# Patient Record
Sex: MALE
[De-identification: names, ages, dates, MRNs, and addresses within clinical notes are randomized per-mention and may not be internally consistent; named-entity substitution may affect disease eponyms.]

---

## 2024-07-05 PROBLEM — Z00.00 ENCOUNTER FOR PREVENTIVE HEALTH EXAMINATION: Status: ACTIVE | Noted: 2024-07-05

## 2024-07-15 ENCOUNTER — APPOINTMENT (OUTPATIENT)
Dept: ORTHOPEDIC SURGERY | Facility: CLINIC | Age: 19
End: 2024-07-15

## 2024-07-16 ENCOUNTER — APPOINTMENT (OUTPATIENT)
Dept: ORTHOPEDIC SURGERY | Facility: CLINIC | Age: 19
End: 2024-07-16
Payer: MEDICAID

## 2024-07-16 DIAGNOSIS — S62.021K DISPLACED FRACTURE OF MIDDLE THIRD OF NAVICULAR [SCAPHOID] BONE OF RIGHT WRIST, SUBSEQUENT ENCOUNTER FOR FRACTURE WITH NONUNION: ICD-10-CM

## 2024-08-20 ENCOUNTER — OUTPATIENT (OUTPATIENT)
Dept: OUTPATIENT SERVICES | Facility: HOSPITAL | Age: 19
LOS: 1 days | Discharge: HOME | End: 2024-08-20

## 2024-08-20 VITALS
OXYGEN SATURATION: 98 % | RESPIRATION RATE: 18 BRPM | DIASTOLIC BLOOD PRESSURE: 79 MMHG | HEIGHT: 68 IN | WEIGHT: 134.92 LBS | HEART RATE: 84 BPM | SYSTOLIC BLOOD PRESSURE: 120 MMHG | TEMPERATURE: 97 F

## 2024-08-20 DIAGNOSIS — S62.021K DISPLACED FRACTURE OF MIDDLE THIRD OF NAVICULAR [SCAPHOID] BONE OF RIGHT WRIST, SUBSEQUENT ENCOUNTER FOR FRACTURE WITH NONUNION: ICD-10-CM

## 2024-08-20 DIAGNOSIS — Z01.818 ENCOUNTER FOR OTHER PREPROCEDURAL EXAMINATION: ICD-10-CM

## 2024-08-20 DIAGNOSIS — Z98.890 OTHER SPECIFIED POSTPROCEDURAL STATES: Chronic | ICD-10-CM

## 2024-08-20 LAB
ANION GAP SERPL CALC-SCNC: 7 MMOL/L — SIGNIFICANT CHANGE UP (ref 5–17)
BUN SERPL-MCNC: 11 MG/DL — SIGNIFICANT CHANGE UP (ref 7–23)
CALCIUM SERPL-MCNC: 9.7 MG/DL — SIGNIFICANT CHANGE UP (ref 8.5–10.1)
CHLORIDE SERPL-SCNC: 107 MMOL/L — SIGNIFICANT CHANGE UP (ref 96–108)
CO2 SERPL-SCNC: 27 MMOL/L — SIGNIFICANT CHANGE UP (ref 22–31)
CREAT SERPL-MCNC: 0.86 MG/DL — SIGNIFICANT CHANGE UP (ref 0.5–1.3)
EGFR: 126 ML/MIN/1.73M2 — SIGNIFICANT CHANGE UP
GLUCOSE SERPL-MCNC: 94 MG/DL — SIGNIFICANT CHANGE UP (ref 70–99)
HCT VFR BLD CALC: 45.5 % — SIGNIFICANT CHANGE UP (ref 39–50)
HGB BLD-MCNC: 14.6 G/DL — SIGNIFICANT CHANGE UP (ref 13–17)
MCHC RBC-ENTMCNC: 27.3 PG — SIGNIFICANT CHANGE UP (ref 27–34)
MCHC RBC-ENTMCNC: 32.1 G/DL — SIGNIFICANT CHANGE UP (ref 32–36)
MCV RBC AUTO: 85.2 FL — SIGNIFICANT CHANGE UP (ref 80–100)
NRBC # BLD: 0 /100 WBCS — SIGNIFICANT CHANGE UP (ref 0–0)
PLATELET # BLD AUTO: 182 K/UL — SIGNIFICANT CHANGE UP (ref 150–400)
POTASSIUM SERPL-MCNC: 4.1 MMOL/L — SIGNIFICANT CHANGE UP (ref 3.5–5.3)
POTASSIUM SERPL-SCNC: 4.1 MMOL/L — SIGNIFICANT CHANGE UP (ref 3.5–5.3)
RBC # BLD: 5.34 M/UL — SIGNIFICANT CHANGE UP (ref 4.2–5.8)
RBC # FLD: 12.8 % — SIGNIFICANT CHANGE UP (ref 10.3–14.5)
SODIUM SERPL-SCNC: 141 MMOL/L — SIGNIFICANT CHANGE UP (ref 135–145)
WBC # BLD: 4.25 K/UL — SIGNIFICANT CHANGE UP (ref 3.8–10.5)
WBC # FLD AUTO: 4.25 K/UL — SIGNIFICANT CHANGE UP (ref 3.8–10.5)

## 2024-08-20 RX ORDER — SODIUM CHLORIDE 9 MG/ML
3 INJECTION INTRAMUSCULAR; INTRAVENOUS; SUBCUTANEOUS EVERY 8 HOURS
Refills: 0 | Status: DISCONTINUED | OUTPATIENT
Start: 2024-09-06 | End: 2024-09-06

## 2024-08-20 NOTE — H&P PST ADULT - PROBLEM SELECTOR PLAN 1
Labs-CBC, BMP  Preop Hibiclens x 1 day instructions reviewed and given  Take routine meds DOS with small sips of water, avoid NSAIDs and OTC supplements  Anesthesiologist to review PST labs, EKG, required clearances, and optimization for surgery

## 2024-08-20 NOTE — H&P PST ADULT - HISTORY OF PRESENT ILLNESS
19M  presents to PSTs/p fall April 2024 and is scheduled for ORIF of right scaphoid fx non union with distal autograft with image on 9/6/24 with

## 2024-08-20 NOTE — H&P PST ADULT - ASSESSMENT
19M  presents to PSTs/p fall 2024 and is scheduled for ORIF of right scaphoid fx non union with distal autograft with image on 24 with Dr.Wu CAPRINI SCORE    AGE RELATED RISK FACTORS                                                             [ ] Age 41-60 years                                            (1 Point)  [ ] Age: 61-74 years                                           (2 Points)                 [ ] Age= 75 years                                                (3 Points)             DISEASE RELATED RISK FACTORS                                                       [ ] Edema in the lower extremities                 (1 Point)                     [ ] Varicose veins                                               (1 Point)                                 [ ] BMI > 25 Kg/m2                                            (1 Point)                                  [ ] Serious infection (ie PNA)                            (1 Point)                     [ ] Lung disease ( COPD, Emphysema)            (1 Point)                                                                          [ ] Acute myocardial infarction                         (1 Point)                  [ ] Congestive heart failure (in the previous month)  (1 Point)         [ ] Inflammatory bowel disease                            (1 Point)                  [ ] Central venous access, PICC or Port               (2 points)       (within the last month)                                                                [ ] Stroke (in the previous month)                        (5 Points)    [ ] Previous or present malignancy                       (2 points)                                                                                                                                                         HEMATOLOGY RELATED FACTORS                                                         [ ] Prior episodes of VTE                                     (3 Points)                     [ ] Positive family history for VTE                      (3 Points)                  [ ] Prothrombin 91709 A                                     (3 Points)                     [ ] Factor V Leiden                                                (3 Points)                        [ ] Lupus anticoagulants                                      (3 Points)                                                           [ ] Anticardiolipin antibodies                              (3 Points)                                                       [ ] High homocysteine in the blood                   (3 Points)                                             [ ] Other congenital or acquired thrombophilia      (3 Points)                                                [ ] Heparin induced thrombocytopenia                  (3 Points)                                        MOBILITY RELATED FACTORS  [ ] Bed rest                                                         (1 Point)  [ ] Plaster cast                                                    (2 points)  [ ] Bed bound for more than 72 hours           (2 Points)    GENDER SPECIFIC FACTORS  [ ] Pregnancy or had a baby within the last month   (1 Point)  [ ] Post-partum < 6 weeks                                   (1 Point)  [ ] Hormonal therapy  or oral contraception   (1 Point)  [ ] History of pregnancy complications              (1 point)  [ ] Unexplained or recurrent              (1 Point)    OTHER RISK FACTORS                                           (1 Point)  [ ] BMI >40, smoking, diabetes requiring insulin, chemotherapy  blood transfusions and length of surgery over 2 hours    SURGERY RELATED RISK FACTORS  [ ]  Section within the last month     (1 Point)  [ ] Minor surgery                                                  (1 Point)  [ ] Arthroscopic surgery                                       (2 Points)  [x] Planned major surgery lasting more            (2 Points)      than 45 minutes     [ ] Elective hip or knee joint replacement       (5 points)       surgery                                                TRAUMA RELATED RISK FACTORS  [ ] Fracture of the hip, pelvis, or leg                       (5 Points)  [ ] Spinal cord injury resulting in paralysis             (5 points)       (in the previous month)    [ ] Paralysis  (less than 1 month)                             (5 Points)  [ ] Multiple Trauma within 1 month                        (5 Points)    Total Score [  2     ]    Caprini Score 0-2: Low Risk, NO VTE prophylaxis required for most patients, encourage ambulation  Caprini Score 3-6: Moderate Risk , pharmacologic VTE prophylaxis is indicated for most patients (in the absence of contraindications)  Caprini Score Greater than or =7: High risk, pharmocologic VTE prophylaxis indicated for most patients (in the absence of contraindications)

## 2024-08-20 NOTE — H&P PST ADULT - NSANTHOSAYNRD_GEN_A_CORE
No. JEFFERY screening performed.  STOP BANG Legend: 0-2 = LOW Risk; 3-4 = INTERMEDIATE Risk; 5-8 = HIGH Risk

## 2024-09-05 ENCOUNTER — TRANSCRIPTION ENCOUNTER (OUTPATIENT)
Age: 19
End: 2024-09-05

## 2024-09-06 ENCOUNTER — APPOINTMENT (OUTPATIENT)
Dept: ORTHOPEDIC SURGERY | Facility: HOSPITAL | Age: 19
End: 2024-09-06
Payer: MEDICAID

## 2024-09-06 ENCOUNTER — OUTPATIENT (OUTPATIENT)
Dept: OUTPATIENT SERVICES | Facility: HOSPITAL | Age: 19
LOS: 1 days | Discharge: HOME | End: 2024-09-06

## 2024-09-06 ENCOUNTER — TRANSCRIPTION ENCOUNTER (OUTPATIENT)
Age: 19
End: 2024-09-06

## 2024-09-06 VITALS
TEMPERATURE: 98 F | WEIGHT: 134.92 LBS | RESPIRATION RATE: 15 BRPM | HEART RATE: 75 BPM | OXYGEN SATURATION: 99 % | HEIGHT: 68 IN | SYSTOLIC BLOOD PRESSURE: 115 MMHG | DIASTOLIC BLOOD PRESSURE: 61 MMHG

## 2024-09-06 VITALS
HEART RATE: 73 BPM | OXYGEN SATURATION: 100 % | RESPIRATION RATE: 16 BRPM | DIASTOLIC BLOOD PRESSURE: 75 MMHG | SYSTOLIC BLOOD PRESSURE: 118 MMHG

## 2024-09-06 DIAGNOSIS — Z98.890 OTHER SPECIFIED POSTPROCEDURAL STATES: Chronic | ICD-10-CM

## 2024-09-06 PROCEDURE — 25440 REPAIR NONU SCPHD CARPL B1: CPT | Mod: RT

## 2024-09-06 DEVICE — IMPLANTABLE DEVICE: Type: IMPLANTABLE DEVICE | Site: RIGHT | Status: FUNCTIONAL

## 2024-09-06 DEVICE — K-WIRE MEDARTIS (SMOOTH) SINGLE TROCAR 1.1MM X 100MM: Type: IMPLANTABLE DEVICE | Site: RIGHT | Status: FUNCTIONAL

## 2024-09-06 RX ORDER — HYDROCODONE BITARTRATE AND ACETAMINOPHEN 5; 325 MG/1; MG/1
1 TABLET ORAL
Qty: 30 | Refills: 0
Start: 2024-09-06 | End: 2024-09-10

## 2024-09-06 NOTE — ASU DISCHARGE PLAN (ADULT/PEDIATRIC) - CARE PROVIDER_API CALL
Aliya Sevilla  Orthopaedic Surgery  [address] [facility], [address] [place] [id number]  Phone: [phone]  Fax: [phone]  Follow Up Time:

## 2024-09-06 NOTE — BRIEF OPERATIVE NOTE - ESTIMATED BLOOD LOSS
15 Quality 130: Documentation Of Current Medications In The Medical Record: Current Medications Documented Detail Level: Detailed Quality 226: Preventive Care And Screening: Tobacco Use: Screening And Cessation Intervention: Patient screened for tobacco use and is an ex/non-smoker

## 2024-09-06 NOTE — ASU PATIENT PROFILE, ADULT - FALL HARM RISK - HARM RISK INTERVENTIONS

## 2024-09-06 NOTE — ASU DISCHARGE PLAN (ADULT/PEDIATRIC) - NS MD DC FALL RISK RISK
For information on Fall & Injury Prevention, visit: [email or web address] OR  [email or web address] OR  https://www.cdc.gov/steadi/patient.html

## 2024-09-09 ENCOUNTER — NON-APPOINTMENT (OUTPATIENT)
Age: 19
End: 2024-09-09
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
## 2024-09-11 DIAGNOSIS — S62.021A DISPLACED FRACTURE OF MIDDLE THIRD OF NAVICULAR [SCAPHOID] BONE OF RIGHT WRIST, INITIAL ENCOUNTER FOR CLOSED FRACTURE: ICD-10-CM
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
## 2024-09-13 ENCOUNTER — APPOINTMENT (OUTPATIENT)
Dept: ORTHOPEDIC SURGERY | Facility: CLINIC | Age: 19
End: 2024-09-13
Payer: MEDICAID

## 2024-09-13 DIAGNOSIS — S62.021K DISPLACED FRACTURE OF MIDDLE THIRD OF NAVICULAR [SCAPHOID] BONE OF RIGHT WRIST, SUBSEQUENT ENCOUNTER FOR FRACTURE WITH NONUNION: ICD-10-CM

## 2024-09-13 PROBLEM — Z78.9 OTHER SPECIFIED HEALTH STATUS: Chronic | Status: ACTIVE | Noted: 2024-08-20

## 2024-09-13 PROCEDURE — 73110 X-RAY EXAM OF WRIST: CPT | Mod: RT

## 2024-09-13 PROCEDURE — 99024 POSTOP FOLLOW-UP VISIT: CPT

## 2024-09-13 PROCEDURE — 29075 APPL CST ELBW FNGR SHORT ARM: CPT | Mod: RT,58

## 2024-09-13 NOTE — PHYSICAL EXAM
[de-identified] : RIGHT HAND skin intact. mild to moderate swelling of wrist. wrist ROM: deferred. good EPL, FPL. good finger extension, flex to full fist. good finger abduction and adduction.  SILT to median, ulnar, radial distribution.  palpable radial pulse, brisk cap refill all digits. no triggering.   XRAYS OF RIGHT WRIST (3 views - PA, OBLIQUE, AND LATERAL VIEWS): s/p scaphoid fx ORIF with distal radius autograft and intramedullary screw. no hardware complications. persistent scaphoid flexion deformity and dorsal tilt of lunate.

## 2024-09-13 NOTE — ASSESSMENT
[FreeTextEntry1] : - I personally applied a fiberglass short arm thumb spica cast. Reviewed cast care instructions - do not get cast wet, do not remove cast, do not put objects down cast. The importance of elevation was discussed with the patient. We discussed that there is a moderate risk of complications and morbidity with casting, including skin burn, skin irritation, skin breakdown, stiffness from immobilization, and compartment syndrome. We discussed the sign/symptoms of compartment syndrome that would warrant emergent evaluation in the office or ER, including severe swelling, worsening pain, numbness/tingling that does no resolve with elevation, and pale/white/cold fingers. They expressed understanding.  - continue HEP for digital ROM. - NWB to R hand.   F/u 4 weeks for cast change. X-rays R wrist out of cast.

## 2024-09-13 NOTE — HISTORY OF PRESENT ILLNESS
[] : Post Surgical Visit: yes [de-identified] : 9/13/24: 7 days s/p RIGHT scaphoid fracture non-union ORIF with distal radius autograft on 9/6/24. pain well controlled, stopped taking Norco. denies numbness/tingling. denies f/c or ill sx.   7/16/24: 18yo male (RHD. Unemployed) presents for RIGHT wrist pain after a fall in April 2024. PCP ordered XR @[facility] 4/11/24, which showed fracture, but patient did not seek treatment because he did not think it was that serious. + wrist brace. Not currently taking any medication for this.  Hx: none. Sx: none NKDA. Denies tobacco use. [FreeTextEntry5] : BRIAN is here today for RIGHT wrist post op visit #1.  [de-identified] : 09/06/24  [de-identified] : OPEN REDUCTION INTERNAL FIXATION OF RIGHT SCAPHOID FRACTURE NON-UNION

## 2024-10-11 ENCOUNTER — APPOINTMENT (OUTPATIENT)
Dept: ORTHOPEDIC SURGERY | Facility: CLINIC | Age: 19
End: 2024-10-11
Payer: MEDICAID

## 2024-10-11 ENCOUNTER — TRANSCRIPTION ENCOUNTER (OUTPATIENT)
Age: 19
End: 2024-10-11

## 2024-10-11 DIAGNOSIS — S62.021K DISPLACED FRACTURE OF MIDDLE THIRD OF NAVICULAR [SCAPHOID] BONE OF RIGHT WRIST, SUBSEQUENT ENCOUNTER FOR FRACTURE WITH NONUNION: ICD-10-CM

## 2024-10-11 PROCEDURE — 29075 APPL CST ELBW FNGR SHORT ARM: CPT | Mod: RT,58

## 2024-10-11 PROCEDURE — 99024 POSTOP FOLLOW-UP VISIT: CPT

## 2024-10-11 PROCEDURE — 73110 X-RAY EXAM OF WRIST: CPT | Mod: RT

## 2024-10-11 NOTE — ASSESSMENT
[FreeTextEntry1] : - I personally applied a new fiberglass short arm thumb spica cast. Reviewed cast care instructions - do not get cast wet, do not remove cast, do not put objects down cast. The importance of elevation was discussed with the patient. We discussed that there is a moderate risk of complications and morbidity with casting, including skin burn, skin irritation, skin breakdown, stiffness from immobilization, and compartment syndrome. We discussed the sign/symptoms of compartment syndrome that would warrant emergent evaluation in the office or ER, including severe swelling, worsening pain, numbness/tingling that does no resolve with elevation, and pale/white/cold fingers. They expressed understanding.  - NWB to R hand.   F/u 4 weeks for cast change. X-rays R wrist + scaphoid view out of cast.

## 2024-10-11 NOTE — PHYSICAL EXAM
[de-identified] : RIGHT HAND well healed scar over volar radial wrist. wrist ROM: deferred. good EPL, FPL. good finger extension, flex to full fist. good finger abduction and adduction.  SILT to median, ulnar, radial distribution.  palpable radial pulse, brisk cap refill all digits. no triggering.   XRAYS OF RIGHT WRIST (3 views - PA, OBLIQUE, AND LATERAL VIEWS): stable position/alignment of scaphoid fx s/p intramedullary screw fixation. no hardware complications. s/p distal radius autograft harvest. persistent scaphoid flexion deformity and dorsal tilt of lunate.

## 2024-10-11 NOTE — HISTORY OF PRESENT ILLNESS
[] : Post Surgical Visit: yes [de-identified] : 10/11/24: 5 weeks s/p RIGHT scaphoid fracture non-union ORIF with distal radius autograft on 9/6/24. doing well.  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 pain well controlled, stopped taking Norco. denies numbness/tingling. denies f/c or ill sx.   7/16/24: 18yo male (RHD. Unemployed) presents for RIGHT wrist pain after a fall in April 2024. PCP ordered XR @[facility] 4/11/24, which showed fracture, but patient did not seek treatment because he did not think it was that serious. + wrist brace. Not currently taking any medication for this.  Hx: none. Sx: none NKDA. Denies tobacco use. [FreeTextEntry5] : JORDI is here today for RIGHT wrist post op visit #2 and cast change.  [de-identified] : 09/06/24  [de-identified] : OPEN REDUCTION INTERNAL FIXATION OF RIGHT SCAPHOID FRACTURE NON-UNION

## 2024-11-08 ENCOUNTER — APPOINTMENT (OUTPATIENT)
Dept: ORTHOPEDIC SURGERY | Facility: CLINIC | Age: 19
End: 2024-11-08

## 2024-11-08 DIAGNOSIS — S62.021K DISPLACED FRACTURE OF MIDDLE THIRD OF NAVICULAR [SCAPHOID] BONE OF RIGHT WRIST, SUBSEQUENT ENCOUNTER FOR FRACTURE WITH NONUNION: ICD-10-CM

## 2024-11-08 PROCEDURE — 29125 APPL SHORT ARM SPLINT STATIC: CPT | Mod: 58,RT

## 2024-11-08 PROCEDURE — 99024 POSTOP FOLLOW-UP VISIT: CPT

## 2024-11-08 PROCEDURE — 73110 X-RAY EXAM OF WRIST: CPT | Mod: RT

## 2024-11-08 NOTE — ASSESSMENT
[FreeTextEntry1] : - CT of R wrist to evaluate scaphoid fracture healing. - removed fiberglass short arm thumb spica cast. - I personally applied an orthoglass (pre-padded fiberglass) short arm volar wrist splint. Reviewed splint care instructions - do not remove splint, do not get splint wet, do not put objects down splint. We discussed that there is a moderate risk of complications and morbidity with splinting, including skin burn, skin irritation, skin breakdown, and stiffness from immobilization. They expressed understanding. They expressed understanding.  - NWB to R hand.   F/u after CT.

## 2024-11-08 NOTE — PHYSICAL EXAM
[de-identified] : RIGHT HAND well healed scar over volar radial wrist. non-TTP to scaphoid. wrist ROM: deferred. good EPL, FPL. good finger extension, flex to full fist. good finger abduction and adduction.  SILT to median, ulnar, radial distribution.  palpable radial pulse, brisk cap refill all digits. no triggering.   XRAYS OF RIGHT WRIST (3 views - PA, OBLIQUE, AND LATERAL VIEWS): stable position/alignment with interval healing of scaphoid fx s/p intramedullary screw fixation. no hardware complications. s/p distal radius autograft harvest. persistent scaphoid flexion deformity and dorsal tilt of lunate.

## 2024-11-08 NOTE — HISTORY OF PRESENT ILLNESS
[] : Post Surgical Visit: yes [de-identified] : 11/08/24: 9 weeks s/p RIGHT scaphoid fracture non-union ORIF with distal radius autograft on 9/6/24. in thumb spica cast. doing well.  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 doing well.  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 pain well controlled, stopped taking Norco. denies numbness/tingling. denies f/c or ill sx.   7/16/24: 20yo male (RHD. Unemployed) presents for RIGHT wrist pain after a fall in April 2024. PCP ordered XR @[facility] 4/11/24, which showed fracture, but patient did not seek treatment because he did not think it was that serious. + wrist brace. Not currently taking any medication for this.  Hx: none. Sx: none NKDA. Denies tobacco use. [FreeTextEntry5] : JORDI is here today for RIGHT wrist post op visit #3 and cast change. pt denies pain today.  [de-identified] : 09/06/24  [de-identified] : OPEN REDUCTION INTERNAL FIXATION OF RIGHT SCAPHOID FRACTURE NON-UNION

## 2024-11-19 ENCOUNTER — APPOINTMENT (OUTPATIENT)
Dept: ORTHOPEDIC SURGERY | Facility: CLINIC | Age: 19
End: 2024-11-19
Payer: MEDICAID

## 2024-11-19 DIAGNOSIS — S62.021K DISPLACED FRACTURE OF MIDDLE THIRD OF NAVICULAR [SCAPHOID] BONE OF RIGHT WRIST, SUBSEQUENT ENCOUNTER FOR FRACTURE WITH NONUNION: ICD-10-CM

## 2024-11-19 PROCEDURE — 99024 POSTOP FOLLOW-UP VISIT: CPT

## 2024-11-19 NOTE — IMAGING
[de-identified] : RIGHT HAND well healed scar over volar radial wrist. non-TTP to scaphoid. wrist ROM: very limited extension, flexion. good EPL, FPL. good finger extension, flex to full fist. good finger abduction and adduction.  SILT to median, ulnar, radial distribution.  palpable radial pulse, brisk cap refill all digits. no triggering.

## 2024-11-19 NOTE — HISTORY OF PRESENT ILLNESS
[de-identified] : 11/19/24: f/u CT of R wrist. 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 in volar wrist splint.  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 in thumb spica cast. doing well.  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 doing well.  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 pain well controlled, stopped taking Norco. denies numbness/tingling. denies f/c or ill sx.   7/16/24: 18yo male (RHD. Unemployed) presents for RIGHT wrist pain after a fall in April 2024. PCP ordered XR @[facility] 4/11/24, which showed fracture, but patient did not seek treatment because he did not think it was that serious. + wrist brace. Not currently taking any medication for this.  Hx: none. Sx: none NKDA. Denies tobacco use. [FreeTextEntry5] : JORDI is here today for RIGHT wrist CT results from [facility].

## 2024-11-19 NOTE — ASSESSMENT
[FreeTextEntry1] : CT of R wrist reviewed with patient - >50% healed scaphoid waist fx s/p intramedullary screw fixation. persistent dorsal tilt of lunate. Discussed risk of persistent pain, stiffness, arthritis due to persistent carpal malalignment. Patient expressed understanding.  - removed orthoglass (pre-padded fiberglass) short arm volar wrist splint. applied ACE wrap PRN comfort. - prescribed OT for R wrist. demonstrated HEP wrist stretches, goal 5x/day. - light activity.   F/u 4 weeks. X-rays of R wrist + scaphoid view.

## 2024-11-19 NOTE — HISTORY OF PRESENT ILLNESS
[de-identified] : 11/19/24: f/u CT of R wrist. 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 in volar wrist splint.  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 in thumb spica cast. doing well.  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 doing well.  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 pain well controlled, stopped taking Norco. denies numbness/tingling. denies f/c or ill sx.   7/16/24: 20yo male (RHD. Unemployed) presents for RIGHT wrist pain after a fall in April 2024. PCP ordered XR @[facility] 4/11/24, which showed fracture, but patient did not seek treatment because he did not think it was that serious. + wrist brace. Not currently taking any medication for this.  Hx: none. Sx: none NKDA. Denies tobacco use. [FreeTextEntry5] : JORDI is here today for RIGHT wrist CT results from [facility].

## 2024-11-19 NOTE — IMAGING
[de-identified] : RIGHT HAND well healed scar over volar radial wrist. non-TTP to scaphoid. wrist ROM: very limited extension, flexion. good EPL, FPL. good finger extension, flex to full fist. good finger abduction and adduction.  SILT to median, ulnar, radial distribution.  palpable radial pulse, brisk cap refill all digits. no triggering.

## 2025-01-07 ENCOUNTER — APPOINTMENT (OUTPATIENT)
Dept: ORTHOPEDIC SURGERY | Facility: CLINIC | Age: 20
End: 2025-01-07
Payer: MEDICAID

## 2025-01-07 DIAGNOSIS — S62.021K DISPLACED FRACTURE OF MIDDLE THIRD OF NAVICULAR [SCAPHOID] BONE OF RIGHT WRIST, SUBSEQUENT ENCOUNTER FOR FRACTURE WITH NONUNION: ICD-10-CM

## 2025-01-07 PROCEDURE — 99213 OFFICE O/P EST LOW 20 MIN: CPT

## 2025-01-07 PROCEDURE — 73110 X-RAY EXAM OF WRIST: CPT | Mod: RT

## 2025-01-07 NOTE — IMAGING
[de-identified] : RIGHT HAND well healed scar over volar radial wrist. no TTP. wrist ROM: mild limited extension, flexion. good pronation, supination. good EPL, FPL. good finger extension, flex to full fist. good finger abduction and adduction.  SILT to median, ulnar, radial distribution.  palpable radial pulse, brisk cap refill all digits. no triggering.  XRAYS OF RIGHT WRIST (3 views - PA, OBLIQUE, AND LATERAL VIEWS): stable position/alignment with interval healing of scaphoid fx. interval healing of distal radius autograft harvest site. persistent dorsal tilt of lunate.

## 2025-01-07 NOTE — HISTORY OF PRESENT ILLNESS
[de-identified] : 1/7/25: 4 months s/p RIGHT scaphoid fracture non-union ORIF with distal radius autograft on 9/6/24.  He is scheduled to start OT in [place] next week. Performing HEP wrist stretches. Reports intermittent pain that occurs randomly, but it is tolerable. Denies pain with ADLs.  11/19/24: f/u CT of R wrist. 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 in volar wrist splint.  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 in thumb spica cast. doing well.  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 doing well.  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 pain well controlled, stopped taking Norco. denies numbness/tingling. denies f/c or ill sx.   7/16/24: 20yo male (RHD. Unemployed) presents for RIGHT wrist pain after a fall in April 2024. PCP ordered XR @[facility] 4/11/24, which showed fracture, but patient did not seek treatment because he did not think it was that serious. + wrist brace. Not currently taking any medication for this.  Hx: none. Sx: none NKDA. Denies tobacco use. [FreeTextEntry5] : JORDI is here today to follow up on his RIGHT wrist. denies pain today. states he is scheduled to start therapy next week.

## 2025-01-07 NOTE — ASSESSMENT
[FreeTextEntry1] : - awaiting OT for R wrist. continue HEP wrist stretches, goal 5x/day. - advance activity as pain allows. avoid sudden impact, discussed modified position for push-ups.   F/u 2 months. X-rays of R wrist + scaphoid.

## 2025-03-11 ENCOUNTER — APPOINTMENT (OUTPATIENT)
Dept: ORTHOPEDIC SURGERY | Facility: CLINIC | Age: 20
End: 2025-03-11
Payer: MEDICAID

## 2025-03-11 DIAGNOSIS — S62.021K DISPLACED FRACTURE OF MIDDLE THIRD OF NAVICULAR [SCAPHOID] BONE OF RIGHT WRIST, SUBSEQUENT ENCOUNTER FOR FRACTURE WITH NONUNION: ICD-10-CM

## 2025-03-11 PROCEDURE — 73110 X-RAY EXAM OF WRIST: CPT | Mod: RT

## 2025-03-11 PROCEDURE — 99213 OFFICE O/P EST LOW 20 MIN: CPT

## 2025-03-11 NOTE — IMAGING
[de-identified] : RIGHT HAND well healed scar over volar radial wrist. no TTP. wrist ROM: mild limited extension, flexion. good pronation, supination. good EPL, FPL. good finger extension, flex to full fist. good finger abduction and adduction.  SILT to median, ulnar, radial distribution.  palpable radial pulse, brisk cap refill all digits. no triggering.  @3/11/25 XRAYS OF RIGHT WRIST (3 views - PA, OBLIQUE, AND LATERAL VIEWS): stable position/alignment with interval healing of scaphoid fx s/p intramedullary screw fixation, no hardware complications. persistent dorsal tilt of lunate.

## 2025-03-11 NOTE — ASSESSMENT
[FreeTextEntry1] : - renewed OT for R wrist. WBAT. - advance activity as pain allows. avoid sudden impact, discussed modified position for push-ups.   F/u 3 months. X-rays of R wrist + scaphoid view.

## 2025-03-11 NOTE — HISTORY OF PRESENT ILLNESS
[de-identified] : 3/11/25: 6 months s/p RIGHT scaphoid fracture non-union ORIF with distal radius autograft on 9/6/24. notes pain when pushing through palm, otherwise doing well. OT 2x/wk in [place].  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  He is scheduled to start OT in [place] next week. Performing HEP wrist stretches. Reports intermittent pain that occurs randomly, but it is tolerable. Denies pain with ADLs.  11/19/24: f/u CT of R wrist. 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 in volar wrist splint.  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 in thumb spica cast. doing well.  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 doing well.  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 pain well controlled, stopped taking Norco. denies numbness/tingling. denies f/c or ill sx.   7/16/24: 18yo male (RHD. Unemployed) presents for RIGHT wrist pain after a fall in April 2024. PCP ordered XR @[facility] 4/11/24, which showed fracture, but patient did not seek treatment because he did not think it was that serious. + wrist brace. Not currently taking any medication for this.  Hx: none. Sx: none NKDA. Denies tobacco use. [FreeTextEntry5] : patient is here today to follow up on rt wrist. notes charlotte bledsoe

## 2025-06-24 ENCOUNTER — APPOINTMENT (OUTPATIENT)
Dept: ORTHOPEDIC SURGERY | Facility: CLINIC | Age: 20
End: 2025-06-24
Payer: MEDICAID

## 2025-06-24 PROCEDURE — 99212 OFFICE O/P EST SF 10 MIN: CPT

## 2025-06-24 PROCEDURE — 73110 X-RAY EXAM OF WRIST: CPT | Mod: RT

## 2025-06-24 NOTE — ASSESSMENT
[FreeTextEntry1] : - continue HEP for 1 year from date of surgery. - activity as tolerated. avoid sudden impact, reviewed modified position for push-ups.   F/u 3 months. X-rays of R wrist + scaphoid view + bilateral  view.

## 2025-06-24 NOTE — IMAGING
[de-identified] : RIGHT HAND well healed scar over volar radial wrist. no TTP. wrist ROM: mild limited extension, good flexion. good pronation, supination. good EPL, FPL. good finger extension, flex to full fist. good finger abduction and adduction.  SILT to median, ulnar, radial distribution.  palpable radial pulse, brisk cap refill all digits. no triggering.  @6/24/25 XRAYS OF RIGHT WRIST (3 views - PA, OBLIQUE, AND LATERAL VIEWS): healed scaphoid fx s/p intramedullary screw fixation, no hardware complications. stable dorsal tilt of lunate.

## 2025-06-24 NOTE — HISTORY OF PRESENT ILLNESS
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 completed OT. reports minimal pain with heavy pushing or when stretching his wrist in extension, otherwise doing well. reports persistent stiffness with wrist extension.  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 notes pain when pushing through palm, otherwise doing well. OT 2x/wk in [place].  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  He is scheduled to start OT in [place] next week. Performing HEP wrist stretches. Reports intermittent pain that occurs randomly, but it is tolerable. Denies pain with ADLs.  11/19/24: f/u CT of R wrist. 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 in volar wrist splint.  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 in thumb spica cast. doing well.  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 doing well.  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 pain well controlled, stopped taking Norco. denies numbness/tingling. denies f/c or ill sx.   7/16/24: 18yo male (RHD. Unemployed) presents for RIGHT wrist pain after a fall in April 2024. PCP ordered XR @[facility] 4/11/24, which showed fracture, but patient did not seek treatment because he did not think it was that serious. + wrist brace. Not currently taking any medication for this.  Hx: none. Sx: none NKDA. Denies tobacco use. [FreeTextEntry5] : JORDI is here today to follow up on his RIGHT wrist. reports stiffness, pain and some resistance with pushups.

## (undated) DEVICE — SUT ETHILON 4-0 18" PS-2

## (undated) DEVICE — GLV 6 PROTEXIS (BLUE)

## (undated) DEVICE — NDL HYPO SAFE 18G X 1.5" (PINK)

## (undated) DEVICE — TOURNIQUET ESMARK 4"

## (undated) DEVICE — DRSG ACE BANDAGE 3"

## (undated) DEVICE — PACK ORTHO

## (undated) DEVICE — DRILL BIT MEDARTIS CANN 2.1MM

## (undated) DEVICE — GLV 6.5 PROTEXIS (WHITE)

## (undated) DEVICE — FRA-ESU BOVIE FORCE TRIAD T7J19745DX: Type: DURABLE MEDICAL EQUIPMENT

## (undated) DEVICE — ELCTR BIPOLAR CORD 12FT

## (undated) DEVICE — DRAPE EXTREMITY 87" X 128.5"

## (undated) DEVICE — SYR LUER LOK 20CC

## (undated) DEVICE — DRAPE TOWEL BLUE 17" X 24"

## (undated) DEVICE — FRA-TOURNIQUET 402010120020: Type: DURABLE MEDICAL EQUIPMENT